# Patient Record
Sex: FEMALE | Race: WHITE | NOT HISPANIC OR LATINO | Employment: UNEMPLOYED | URBAN - METROPOLITAN AREA
[De-identification: names, ages, dates, MRNs, and addresses within clinical notes are randomized per-mention and may not be internally consistent; named-entity substitution may affect disease eponyms.]

---

## 2022-01-03 ENCOUNTER — TELEPHONE (OUTPATIENT)
Dept: NEUROSURGERY | Facility: CLINIC | Age: 57
End: 2022-01-03
Payer: OTHER GOVERNMENT

## 2022-01-03 NOTE — TELEPHONE ENCOUNTER
Returned call appointment rescheduled for patient to see Dr. Flowers for 2nd opinion for chair malformation/synix.

## 2022-01-03 NOTE — TELEPHONE ENCOUNTER
----- Message from Marizol Adame sent at 1/3/2022 11:50 AM CST -----  Contact: [phone]  Pt returning missed call from Selene. Pt would like a call back.    [phone]

## 2022-03-07 ENCOUNTER — TELEPHONE (OUTPATIENT)
Dept: NEUROSURGERY | Facility: CLINIC | Age: 57
End: 2022-03-07
Payer: OTHER GOVERNMENT

## 2022-05-30 ENCOUNTER — OFFICE VISIT (OUTPATIENT)
Dept: NEUROSURGERY | Facility: CLINIC | Age: 57
End: 2022-05-30
Payer: OTHER GOVERNMENT

## 2022-05-30 VITALS — DIASTOLIC BLOOD PRESSURE: 74 MMHG | HEART RATE: 71 BPM | TEMPERATURE: 97 F | SYSTOLIC BLOOD PRESSURE: 121 MMHG

## 2022-05-30 DIAGNOSIS — G95.0 SYRINX: ICD-10-CM

## 2022-05-30 DIAGNOSIS — G93.5 CHIARI I MALFORMATION: Primary | ICD-10-CM

## 2022-05-30 PROCEDURE — 99212 OFFICE O/P EST SF 10 MIN: CPT | Mod: PBBFAC | Performed by: STUDENT IN AN ORGANIZED HEALTH CARE EDUCATION/TRAINING PROGRAM

## 2022-05-30 PROCEDURE — 99999 PR PBB SHADOW E&M-EST. PATIENT-LVL II: ICD-10-PCS | Mod: PBBFAC,,, | Performed by: STUDENT IN AN ORGANIZED HEALTH CARE EDUCATION/TRAINING PROGRAM

## 2022-05-30 PROCEDURE — 99202 PR OFFICE/OUTPT VISIT, NEW, LEVL II, 15-29 MIN: ICD-10-PCS | Mod: S$PBB,,, | Performed by: STUDENT IN AN ORGANIZED HEALTH CARE EDUCATION/TRAINING PROGRAM

## 2022-05-30 PROCEDURE — 99202 OFFICE O/P NEW SF 15 MIN: CPT | Mod: S$PBB,,, | Performed by: STUDENT IN AN ORGANIZED HEALTH CARE EDUCATION/TRAINING PROGRAM

## 2022-05-30 PROCEDURE — 99999 PR PBB SHADOW E&M-EST. PATIENT-LVL II: CPT | Mod: PBBFAC,,, | Performed by: STUDENT IN AN ORGANIZED HEALTH CARE EDUCATION/TRAINING PROGRAM

## 2022-05-30 NOTE — PROGRESS NOTES
Neurosurgery  History & Physical    SUBJECTIVE:     Chief Complaint: Chiari malformation     History of Present Illness:  Chiari malformation with syrinx initially diagnosed approximately 20 years ago.  She has posterior headaches and neck pain radiating to her left shoulder and upper arm.  She was initially seen by Dr. Boyd Rangel for evaluation who recommended surgical decompression.   Her symptoms are longstanding but have been progressive over the past year and she now has neck pain daily. Her posterior headaches and neck pain are exacerbated by Valsalva and her neck pain is worse with bending forward, e.g. reading a book or her texting on her cell phone.  She uses rolled towels to support her head and neck. She reports generalized weakness in the left arm associated with muscular atrophy and she does occasionally drop her coffee cup from her left hand.  Also reports constipation and urinary incontinence for approximately 1 year and now wears Poise regularly.  She has occasional dizziness and vertigo that occurs every 1-2 months.   Denies difficulty with balance, gait changes or decreased coordination.  No numbness or tingling.    Review of patient's allergies indicates:  Not on File    No current outpatient medications on file.     No current facility-administered medications for this visit.       No past medical history on file.  No past surgical history on file.  Family History    None       Social History     Socioeconomic History    Marital status: Unknown       Review of Systems   Gastrointestinal: Positive for constipation.   Genitourinary:        Urinary incontinence   Musculoskeletal: Positive for neck pain.   Neurological: Positive for dizziness and headaches.   All other systems reviewed and are negative.      OBJECTIVE:     Vital Signs  Temp: 97.2 °F (36.2 °C)  There is no height or weight on file to calculate BMI.      Physical Exam:  Nursing note and vitals reviewed.    General: well developed,  well nourished, no distress.   Pulmonary: no signs of respiratory distress, comfortable appearing on room air  Abdomen: soft, non-distended, not tender to palpation  Skin: Skin is warm, dry and intact.  Extremities: no edema, intact distal pulses    Neuro:  Mental Status: Alert and oriented. Oriented x 4  Language/language: No aphasia. No dysarthria.   Cranial nerves: PERRL, EOMI, face symmetric, tongue/palate midline  Sensory: intact to light touch throughout  Motor Strength:  Strength  Deltoids Triceps Biceps Wrist Extension Wrist Flexion Hand    Upper: R 5/5 5/5 5/5 5/5 5/5 5/5    L 5/5 5/5 5/5 5/5 5/5 5/5     Iliopsoas Quadriceps Knee  Flexion Tibialis  anterior Gastro- cnemius EHL   Lower: R 5/5 5/5 5/5 5/5 5/5 5/5    L 5/5 5/5 5/5 5/5 5/5 5/5   Reflexes: DTR: 2+ symmetrically throughout. Null negative & clonus negative bilateral.  Gait stable, fluid. No difficulty with tandem gait: Able to walk on heels & toes  Spine: cervical ROM full with flexion, extension, lateral rotation and ear-to-shoulder bend. No midline TTP over cervical, thoracic or lumbar spine    Diagnostic Results:  None available    ASSESSMENT/PLAN:     57 yo female with reported history of Chiari I malformation and cervical disc herniations.  Unfortunately, the imaging she brought with her today is not the appropriate study and she will need to mail a copy of her imaging.  I will plan to follow up with her to discuss relevant recommendations after review of her MRI cervical & thoracic spine.        Note dictated with voice recognition software, please excuse any grammatical errors.       stable

## 2022-06-23 ENCOUNTER — TELEPHONE (OUTPATIENT)
Dept: NEUROSURGERY | Facility: CLINIC | Age: 57
End: 2022-06-23
Payer: OTHER GOVERNMENT

## 2022-06-24 ENCOUNTER — TELEPHONE (OUTPATIENT)
Dept: NEUROSURGERY | Facility: CLINIC | Age: 57
End: 2022-06-24
Payer: OTHER GOVERNMENT

## 2022-06-24 NOTE — TELEPHONE ENCOUNTER
Informed pt that we received disc and mailed it back to her yesterday.    ----- Message from Carol Keene sent at 6/24/2022  8:12 AM CDT -----  Contact: [phone]  Type:  Needs Medical Advice    Who Called: Pt   Reason for call; regarding if the Dr received her MRI Disc  Would the patient rather a call back or a response via [facility]? Call back  Best Call Back Number: [phone]

## 2022-07-22 ENCOUNTER — TELEPHONE (OUTPATIENT)
Dept: NEUROSURGERY | Facility: CLINIC | Age: 57
End: 2022-07-22
Payer: OTHER GOVERNMENT

## 2022-07-22 NOTE — TELEPHONE ENCOUNTER
Spoke with pt and assisted her in setting up her [facility] account for a virtual appt. Confirmed 8/29 virtual appt at 10am worked with pts schedule.    ----- Message from Chelsey Posadas sent at 7/22/2022  8:21 AM CDT -----  Contact: [phone]  PT, would like to Scheduled a  Appointment, please contact @ number provided     [phone]     Pt, does not mind doing a Virtual Appt to discuss MRI

## 2022-08-29 ENCOUNTER — OFFICE VISIT (OUTPATIENT)
Dept: NEUROSURGERY | Facility: CLINIC | Age: 57
End: 2022-08-29
Payer: OTHER GOVERNMENT

## 2022-08-29 DIAGNOSIS — G95.0 SYRINX: ICD-10-CM

## 2022-08-29 DIAGNOSIS — M48.02 CERVICAL STENOSIS OF SPINAL CANAL: ICD-10-CM

## 2022-08-29 DIAGNOSIS — G93.5 CHIARI I MALFORMATION: Primary | ICD-10-CM

## 2022-08-29 DIAGNOSIS — M54.12 CERVICAL RADICULOPATHY: ICD-10-CM

## 2022-08-29 PROCEDURE — 99213 OFFICE O/P EST LOW 20 MIN: CPT | Mod: 95,,, | Performed by: STUDENT IN AN ORGANIZED HEALTH CARE EDUCATION/TRAINING PROGRAM

## 2022-08-29 PROCEDURE — 99213 PR OFFICE/OUTPT VISIT, EST, LEVL III, 20-29 MIN: ICD-10-PCS | Mod: 95,,, | Performed by: STUDENT IN AN ORGANIZED HEALTH CARE EDUCATION/TRAINING PROGRAM

## 2022-08-29 NOTE — PROGRESS NOTES
"The patient location is: [place]  The chief complaint leading to consultation is: f/u Chiari & neck pain    Visit type: audio only    Face to Face time with patient: 20 min  35 minutes of total time spent on the encounter, which includes face to face time and non-face to face time preparing to see the patient (eg, review of tests), Obtaining and/or reviewing separately obtained history, Documenting clinical information in the electronic or other health record, Independently interpreting results (not separately reported) and communicating results to the patient/family/caregiver, or Care coordination (not separately reported).         Each patient to whom he or she provides medical services by telemedicine is:  (1) informed of the relationship between the physician and patient and the respective role of any other health care provider with respect to management of the patient; and (2) notified that he or she may decline to receive medical services by telemedicine and may withdraw from such care at any time.    Notes:     Digital Medicine: Video Consult    Radha Lucero is a 57 yo female who was initially seen in May 2022 for evaluation of a vhiari malformation with syrinx initially diagnosed approximately 20 years ago.  Per my original hpi, "She has posterior headaches and neck pain radiating to her left shoulder and upper arm.  She was initially seen by Dr. Boyd Rangel for evaluation who recommended surgical decompression.   Her symptoms are longstanding but have been progressive over the past year and she now has neck pain daily. Her posterior headaches and neck pain are exacerbated by Valsalva and her neck pain is worse with bending forward, e.g. reading a book or her texting on her cell phone.  She uses rolled towels to support her head and neck. She reports generalized weakness in the left arm associated with muscular atrophy and she does occasionally drop her coffee cup from her left hand.  Also reports " "constipation and urinary incontinence for approximately 1 year and now wears Poise regularly.  She has occasional dizziness and vertigo that occurs every 1-2 months.   Denies difficulty with balance, gait changes or decreased coordination.  No numbness or tingling."     Interval 8/29/22:  She reports no change headaches that are occasionally associated neck pain.  Symptoms last 1-2 days and are exacerbated by flexion, sleeping in wrong position and Valsalva.  No recent dizziness.  Since her last visit, she reports left shoulder and arm aching with her neck pain.  Neck pain can also be independent of headaches and is harder to relieve.  Previously seen at [facility] for cervical stenosis and tried PT at that time but only went to a few sessions.  Cold packs, cervical pillow, resting, and soft neck brace are relieving factors.  Minimal benefit from tylenol and NSAIDs.      Her outside MRI dated 6/10/21 & 10/01/2021 were personally reviewed and I discussed the findings with Ms Lucero.  No hydrocephalus. Cerebellar tonsils descend approximately 7-8mm below the foramen magnum with some mild crowding at the CCJ.  Dilated central canal vs thin thoracic syrinx noted.  There is loss of cervical lordosis with mild kyphotic deformity and spondylotic changes with central canal and b/l foraminal narrowing greatest at C5-6     57 yo female with Chiari malformation and cervical stenosis. She has symptoms with some features of Chiari associated posterior headache in addition to occipital cervical pain and now likely right C5 radicular symptoms.    - referral for PT placed  - EMG/NCV  - f/u 3 months with cervical XR    There is no problem list on file for this patient.      No past medical history on file.    No family history on file.    Social History     Socioeconomic History    Marital status:        Review of patient's allergies indicates:  No Known Allergies    No current outpatient medications on " file.

## 2022-11-10 ENCOUNTER — TELEPHONE (OUTPATIENT)
Dept: NEUROSURGERY | Facility: CLINIC | Age: 57
End: 2022-11-10
Payer: OTHER GOVERNMENT

## 2023-02-09 ENCOUNTER — TELEPHONE (OUTPATIENT)
Dept: NEUROSURGERY | Facility: CLINIC | Age: 58
End: 2023-02-09
Payer: OTHER GOVERNMENT

## 2023-02-16 ENCOUNTER — TELEPHONE (OUTPATIENT)
Dept: NEUROSURGERY | Facility: CLINIC | Age: 58
End: 2023-02-16
Payer: OTHER GOVERNMENT

## 2023-03-10 ENCOUNTER — TELEPHONE (OUTPATIENT)
Dept: NEUROSURGERY | Facility: CLINIC | Age: 58
End: 2023-03-10
Payer: OTHER GOVERNMENT

## 2023-03-10 NOTE — TELEPHONE ENCOUNTER
Informed pt need to reschedule appt with Dr Tabares on 4/7 due to her being out of the office. Informed Dr Tabares's next available was 5/29, but pt stated wanted sooner appt with a physician assistant. Scheduled an appt with Flory Victoria to discuss symptoms and procedure/imaging results on 4/5 at 2:30PM. Pt confirmed time and date worked with their schedule

## 2023-04-05 ENCOUNTER — OFFICE VISIT (OUTPATIENT)
Dept: NEUROSURGERY | Facility: CLINIC | Age: 58
End: 2023-04-05
Payer: OTHER GOVERNMENT

## 2023-04-05 DIAGNOSIS — G93.5 CHIARI I MALFORMATION: Primary | ICD-10-CM

## 2023-04-05 DIAGNOSIS — M48.02 CERVICAL STENOSIS OF SPINAL CANAL: ICD-10-CM

## 2023-04-05 PROCEDURE — 99212 OFFICE O/P EST SF 10 MIN: CPT | Mod: 95,,, | Performed by: PHYSICIAN ASSISTANT

## 2023-04-05 PROCEDURE — 99212 PR OFFICE/OUTPT VISIT, EST, LEVL II, 10-19 MIN: ICD-10-PCS | Mod: 95,,, | Performed by: PHYSICIAN ASSISTANT

## 2023-04-05 NOTE — PROGRESS NOTES
"The patient location is: [place]  The chief complaint leading to consultation is: f/u Chiari & neck pain    Visit type: audio only    Face to Face time with patient: 20 min  35 minutes of total time spent on the encounter, which includes face to face time and non-face to face time preparing to see the patient (eg, review of tests), Obtaining and/or reviewing separately obtained history, Documenting clinical information in the electronic or other health record, Independently interpreting results (not separately reported) and communicating results to the patient/family/caregiver, or Care coordination (not separately reported).         Each patient to whom he or she provides medical services by telemedicine is:  (1) informed of the relationship between the physician and patient and the respective role of any other health care provider with respect to management of the patient; and (2) notified that he or she may decline to receive medical services by telemedicine and may withdraw from such care at any time.    Notes:     Digital Medicine: Video Consult    Radha Lucero is a 55 yo female who was initially seen in May 2022 for evaluation of a vhiari malformation with syrinx initially diagnosed approximately 20 years ago.  Per my original hpi, "She has posterior headaches and neck pain radiating to her left shoulder and upper arm.  She was initially seen by Dr. Boyd Rangel for evaluation who recommended surgical decompression.   Her symptoms are longstanding but have been progressive over the past year and she now has neck pain daily. Her posterior headaches and neck pain are exacerbated by Valsalva and her neck pain is worse with bending forward, e.g. reading a book or her texting on her cell phone.  She uses rolled towels to support her head and neck. She reports generalized weakness in the left arm associated with muscular atrophy and she does occasionally drop her coffee cup from her left hand.  Also reports " "constipation and urinary incontinence for approximately 1 year and now wears Poise regularly.  She has occasional dizziness and vertigo that occurs every 1-2 months.   Denies difficulty with balance, gait changes or decreased coordination.  No numbness or tingling."     Interval 8/29/22:  She reports no change headaches that are occasionally associated neck pain.  Symptoms last 1-2 days and are exacerbated by flexion, sleeping in wrong position and Valsalva.  No recent dizziness.  Since her last visit, she reports left shoulder and arm aching with her neck pain.  Neck pain can also be independent of headaches and is harder to relieve.  Previously seen at [facility] for cervical stenosis and tried PT at that time but only went to a few sessions.  Cold packs, cervical pillow, resting, and soft neck brace are relieving factors.  Minimal benefit from tylenol and NSAIDs.      Her outside MRI dated 6/10/21 & 10/01/2021 were personally reviewed and I discussed the findings with Ms Lucero.  No hydrocephalus. Cerebellar tonsils descend approximately 7-8mm below the foramen magnum with some mild crowding at the CCJ.  Dilated central canal vs thin thoracic syrinx noted.  There is loss of cervical lordosis with mild kyphotic deformity and spondylotic changes with central canal and b/l foraminal narrowing greatest at C5-6     Interval 4/5/23:  Pt reports resolution of her arm pain since her last visit. She did complete physical therapy and reports success with this. She continues with intermittent posterior HA that is worsened when she sleeps in the wrong position or with heavy lifting. She states these are manageable and denies any new associated symptoms. She denies any gait instability or new focal deficits. EMG ordered last visit was negative for cervical radiculopathy.     There is no problem list on file for this patient.      No past medical history on file.    No family history on file.    Social " History     Socioeconomic History    Marital status:        Review of patient's allergies indicates:  No Known Allergies    No current outpatient medications on file.      55 yo female with Chiari malformation and cervical stenosis. She has symptoms with some features of Chiari associated posterior headache in addition to occipital cervical pain. Previous arm pain has resolved since her last evaluation. She states her HA are currently manageable.    - Recommend continued PT for neck pain   - Continue conservative measures for neck pain. Could consider referral to local pain management physician  -Follow up in 6mo - 1 year with any new or worsening symptoms        Flory Victoria PA-C  Neurosurgery

## 2023-05-10 ENCOUNTER — TELEPHONE (OUTPATIENT)
Dept: NEUROSURGERY | Facility: CLINIC | Age: 58
End: 2023-05-10
Payer: OTHER GOVERNMENT

## 2023-05-10 NOTE — TELEPHONE ENCOUNTER
Informed pt per Flory no restrictions or recommendations for acupuncture. Pt v/u    ----- Message from Miranda Pantoja sent at 5/10/2023  1:05 PM CDT -----  Regarding: question  Contact: @  [phone]  Pt called in regards to seeing if acupuncture is something she can do  .....Please call and adv @  [phone]

## 2023-07-24 ENCOUNTER — OFFICE VISIT (OUTPATIENT)
Dept: NEUROSURGERY | Facility: CLINIC | Age: 58
End: 2023-07-24
Payer: OTHER GOVERNMENT

## 2023-07-24 DIAGNOSIS — M50.30 DDD (DEGENERATIVE DISC DISEASE), CERVICAL: ICD-10-CM

## 2023-07-24 DIAGNOSIS — M54.2 CERVICALGIA: Primary | ICD-10-CM

## 2023-07-24 DIAGNOSIS — G93.5 CHIARI MALFORMATION TYPE I: ICD-10-CM

## 2023-07-24 PROCEDURE — 99213 OFFICE O/P EST LOW 20 MIN: CPT | Mod: 95,,, | Performed by: STUDENT IN AN ORGANIZED HEALTH CARE EDUCATION/TRAINING PROGRAM

## 2023-07-24 PROCEDURE — 99213 PR OFFICE/OUTPT VISIT, EST, LEVL III, 20-29 MIN: ICD-10-PCS | Mod: 95,,, | Performed by: STUDENT IN AN ORGANIZED HEALTH CARE EDUCATION/TRAINING PROGRAM

## 2023-07-24 NOTE — PROGRESS NOTES
"The patient location is: [place]  The chief complaint leading to consultation is: neck pain, Chiari    Visit type: audiovisual    Face to Face time with patient: 15 min  30 minutes of total time spent on the encounter, which includes face to face time and non-face to face time preparing to see the patient (eg, review of tests), Obtaining and/or reviewing separately obtained history, Documenting clinical information in the electronic or other health record, Independently interpreting results (not separately reported) and communicating results to the patient/family/caregiver, or Care coordination (not separately reported).         Each patient to whom he or she provides medical services by telemedicine is:  (1) informed of the relationship between the physician and patient and the respective role of any other health care provider with respect to management of the patient; and (2) notified that he or she may decline to receive medical services by telemedicine and may withdraw from such care at any time.    Notes:   Radha Lucero is a 58 yo female who was initially seen for evaluation of a Chiari malformation with syrinx initially diagnosed approximately 20 years ago.  Per my original hpi, "She has posterior headaches and neck pain radiating to her left shoulder and upper arm.  She was initially seen by Dr. Boyd Rangel for evaluation who recommended surgical decompression.   Her symptoms are longstanding but have been progressive over the past year and she now has neck pain daily. Her posterior headaches and neck pain are exacerbated by Valsalva and her neck pain is worse with bending forward, e.g. reading a book or her texting on her cell phone.  She uses rolled towels to support her head and neck. She reports generalized weakness in the left arm associated with muscular atrophy and she does occasionally drop her coffee cup from her left hand.  Also reports constipation and urinary incontinence for approximately 1 year " "and now wears Poise regularly.  She has occasional dizziness and vertigo that occurs every 1-2 months.   Denies difficulty with balance, gait changes or decreased coordination.  No numbness or tingling."    Interval 7/24/23:  Mrs Lucero returns for follow up of progressive symptoms.  In the last 2 weeks, worsening dizziness, diplopia, headache, neck pain and heaviness in her shoulders.  Pain is exacerbated by Valsalva, leaning forward or extending and relieved by sleep.  No clear event or known aggravating factors prior to her recent acute worsening.  Some relief with PT at [facility] but symptoms persistent.      Outside EMG and notes were reviewed. No new imaging.    58 yo female with known Chiari I and cervical spondylosis now with acute on chronic neck & shoulder pain, headaches, dizziness and vision changes.  She is hopeful to avoid any surgical intervention and we discussed medical options for improved symptom control.  If her symptoms proved to be progressive or do not improve over the next few weeks to months and she would like to reconsider her surgical options, she will need updated imaging as her outside MRI was completed in 2021.  She will let me know if  her symptoms do not improve and we will schedule a new MRI of her cervical spine at that time and plan to see her in the office to discuss surgical options if indicated.  "

## 2023-07-27 ENCOUNTER — TELEPHONE (OUTPATIENT)
Dept: NEUROSURGERY | Facility: CLINIC | Age: 58
End: 2023-07-27
Payer: OTHER GOVERNMENT

## 2023-07-27 NOTE — TELEPHONE ENCOUNTER
Spoke with patient.  She described event lasting about 30 minutes of both upper extremities experiencing involuntary jerking motions. She also said she stood up and walked around room during event. She said this was her first time experiencing this.    I discussed with PAs and told patient that this is unlikely related to her chiari. I advised her to see neurology to rule out seizure, and I told her to come straight to ER if similar event happens again. She v/u and will reach back out with any other questions or concerns      ----- Message from Fatoumata Giang sent at 7/27/2023  4:15 PM CDT -----  Regarding: Shaking symptoms  Contact: [phone]  Pt requesting a call back as she had shaking symptoms going on last night for 30 minutes.  Pt not sure if it was seizure or if she should schedule an appt.  Please call to discuss further.

## 2024-02-07 NOTE — TELEPHONE ENCOUNTER
----- Message from Tish Slade sent at 3/7/2022  7:32 AM CST -----  Name Of Caller: Radha     Provider Name: Arsenio Flowers,     Does patient feel the need to be seen today? No     Relationship to the Pt?: pt     Contact Preference?: [phone]    What is the nature of the call?:Pt called to reschedule appt first opening for me is 5/10/22 wanted to see if you can squeeze her in before then        
No sooner availability   
none

## 2024-02-18 NOTE — TELEPHONE ENCOUNTER
Spoke w pt and resent physical therapy orders to [facility] ([facility] in [place] per pt request    ----- Message from Nhi Whipple sent at 11/10/2022  9:14 AM CST -----  Regarding: orders  Contact: [phone]  Pt is requesting to speak with someone in Dr. Tabares office in regards to orders that was placed back in September. Pt is asking new orders to be place. Please call to discuss further.     Yes